# Patient Record
Sex: MALE | Race: BLACK OR AFRICAN AMERICAN
[De-identification: names, ages, dates, MRNs, and addresses within clinical notes are randomized per-mention and may not be internally consistent; named-entity substitution may affect disease eponyms.]

---

## 2018-02-12 NOTE — PHYS DOC
Past History


Past Medical History:  No Pertinent History


Past Surgical History:  No Surgical History


Alcohol Use:  Occasionally


Drug Use:  Marijuana





Adult General


Chief Complaint


Chief Complaint:  DENTAL PROBLEM





HPI


HPI


17-year-old male complaining of right frontal toothache upper teeth. Patient 

has no swelling and no visible abnormality on the tooth. He states he has a 

toothache. There is no drainage. No facial swelling or lymph nodes of the neck. 

No fevers chills sweats or shaking chills. Patient states he took a Norco at 

home but that was all he had left from a previous illness and is requesting 

more pain control





Review of Systems


Review of Systems





Constitutional: Denies fever or chills []


Eyes: Denies change in visual acuity, redness, or eye pain []


HENT: Denies nasal congestion or sore throat []


Respiratory: Denies cough or shortness of breath []


Cardiovascular: No additional information not addressed in HPI []


GI: Denies abdominal pain, nausea, vomiting, bloody stools or diarrhea []


: Denies dysuria or hematuria []


Musculoskeletal: Denies back pain or joint pain []


Integument: Denies rash or skin lesions []


Neurologic: Denies headache, focal weakness or sensory changes []


Endocrine: Denies polyuria or polydipsia []





All other systems were reviewed and found to be within normal limits, except as 

documented in this note.





Allergies


Allergies





Allergies








Coded Allergies Type Severity Reaction Last Updated Verified


 


  No Known Drug Allergies    2/12/18 No











Physical Exam


Physical Exam


Well-appearing patient no acute distress completely normal oropharyngeal exam. 

No gingival abnormality and no drainage. No loose teeth. Patient describes that 

the pain is in the distribution of #7 and #8 with a normal-appearing no 

anterior cervical adenopathy and no facial swelling or skin changes


Constitutional: Well developed, well nourished, no acute distress, non-toxic 

appearance. []


HENT: Normocephalic, atraumatic, bilateral external ears normal, oropharynx 

moist, no oral exudates, nose normal. []


Eyes: PERRLA, EOMI, conjunctiva normal, no discharge. [] 


Neck: Normal range of motion, no tenderness, supple, no stridor. [] 


Cardiovascular:Heart rate regular rhythm, no murmur []


Lungs & Thorax:  Bilateral breath sounds clear to auscultation []


Abdomen: Bowel sounds normal, soft, no tenderness, no masses, no pulsatile 

masses. [] 


Skin: Warm, dry, no erythema, no rash. [] 


Back: No tenderness, no CVA tenderness. [] 


Extremities: No tenderness, no cyanosis, no clubbing, ROM intact, no edema. [] 


Neurologic: Alert and oriented X 3, normal motor function, normal sensory 

function, no focal deficits noted. []


Psychologic: Affect normal, judgement normal, mood normal. []





Current Patient Data


Vital Signs





 Vital Signs








  Date Time  Temp Pulse Resp B/P (MAP) Pulse Ox O2 Delivery O2 Flow Rate FiO2


 


2/12/18 19:00 97.7 72   97 Room Air  











EKG


EKG


[]





Radiology/Procedures


Radiology/Procedures


[]





Course & Med Decision Making


Course & Med Decision Making


Pertinent Labs and Imaging studies reviewed. (See chart for details)


Patient with mild toothache but normal-appearing teeth and gums. Discussed with 

patient possibility of cavity below the gumline and will cover with penicillin 

for this and these were to take ibuprofen and Tylenol as needed finish his 

penicillin as prescribed and follow up with [facility]. No further workup or 

treatment indicated patient agrees with outpatient follow-up and strict return 

precautions given





[]





Dragon Disclaimer


Dragon Disclaimer


This electronic medical record was generated, in whole or in part, using a 

voice recognition dictation system.





Departure


Departure:


Impression:  


 Primary Impression:  


 Toothache


Disposition:  01 HOME, SELF-CARE


Condition:  GOOD


Referrals:  


PCP,UNKNOWN (PCP)


Patient Instructions:  Toothache-Brief





Additional Instructions:  


You are describing the you have a toothache. Your tooth and gums appear normal 

however it is possible to have an infection of the tooth below the gumline. 

Take ibuprofen 600 mg every 6 hours as needed and Tylenol as well if needed for 

pain. Finish penicillin as prescribed 4 times a day for 10 days. Follow-up with 

your dentist in 2 days and return immediately for new severe worsening symptoms

, specifically for severe facial swelling especially in the setting of fevers.


Scripts


Penicillin V Potassium (PENICILLIN V POTASSIUM) 500 Mg Tablet


1 TAB PO QID, #40 TAB


   Prov: RICK LOPEZ MD         2/12/18











RICK LOPEZ MD Feb 12, 2018 19:56

## 2022-05-27 ENCOUNTER — HOSPITAL ENCOUNTER (EMERGENCY)
Dept: HOSPITAL 63 - ER | Age: 22
LOS: 1 days | Discharge: HOME | End: 2022-05-28
Payer: COMMERCIAL

## 2022-05-27 VITALS — DIASTOLIC BLOOD PRESSURE: 103 MMHG | SYSTOLIC BLOOD PRESSURE: 164 MMHG

## 2022-05-27 VITALS — HEIGHT: 72 IN | WEIGHT: 159.39 LBS | BODY MASS INDEX: 21.59 KG/M2

## 2022-05-27 DIAGNOSIS — S80.211A: ICD-10-CM

## 2022-05-27 DIAGNOSIS — M25.512: ICD-10-CM

## 2022-05-27 DIAGNOSIS — S80.212A: Primary | ICD-10-CM
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
## 2022-05-27 PROCEDURE — 73560 X-RAY EXAM OF KNEE 1 OR 2: CPT

## 2022-05-27 PROCEDURE — 99284 EMERGENCY DEPT VISIT MOD MDM: CPT

## 2022-05-27 PROCEDURE — 90715 TDAP VACCINE 7 YRS/> IM: CPT

## 2022-05-27 PROCEDURE — 73030 X-RAY EXAM OF SHOULDER: CPT

## 2022-05-27 PROCEDURE — 90471 IMMUNIZATION ADMIN: CPT

## 2022-05-27 PROCEDURE — 29105 APPLICATION LONG ARM SPLINT: CPT

## 2022-05-27 PROCEDURE — 96372 THER/PROPH/DIAG INJ SC/IM: CPT

## 2022-05-27 NOTE — RAD
Exam: Bilateral knees 2 views



INDICATION: Knee pain, pain



TECHNIQUE: Frontal and lateral views of the right and left knee



Comparisons: None



FINDINGS:

Right knee:

Bone mineralization is normal. No acute or healed fractures. Soft tissues are unremarkable. Joint spa
lilian are well-maintained.



Left knee:

Bone mineralization is normal. No acute fractures. Soft tissues are unremarkable. Joint spaces are we
ll-maintained.



IMPRESSION:

No acute osseous abnormality of the right or left knee



Electronically signed by: Karie Trammell MD (5/27/2022 11:46 PM) [facility]
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
## 2022-05-27 NOTE — RAD
Three views left shoulder



History: pain



Internally and externally rotated AP of shoulder obtained, as well as "Y" view.



The glenohumeral relationship is normal. The visualized osseous structures appear normal.





Impression: 



No acute findings.



end impression



Electronically signed by: Junior Montes III, MD (5/27/2022 11:50 PM) [facility]